# Patient Record
Sex: MALE | Race: WHITE | NOT HISPANIC OR LATINO | Employment: OTHER | URBAN - METROPOLITAN AREA
[De-identification: names, ages, dates, MRNs, and addresses within clinical notes are randomized per-mention and may not be internally consistent; named-entity substitution may affect disease eponyms.]

---

## 2020-01-01 ENCOUNTER — HOSPITAL ENCOUNTER (EMERGENCY)
Facility: HOSPITAL | Age: 75
End: 2020-06-08
Attending: EMERGENCY MEDICINE | Admitting: EMERGENCY MEDICINE

## 2020-01-01 ENCOUNTER — APPOINTMENT (OUTPATIENT)
Dept: CT IMAGING | Facility: HOSPITAL | Age: 75
End: 2020-01-01

## 2020-01-01 ENCOUNTER — APPOINTMENT (OUTPATIENT)
Dept: GENERAL RADIOLOGY | Facility: HOSPITAL | Age: 75
End: 2020-01-01

## 2020-01-01 VITALS
SYSTOLIC BLOOD PRESSURE: 127 MMHG | DIASTOLIC BLOOD PRESSURE: 70 MMHG | BODY MASS INDEX: 37.1 KG/M2 | TEMPERATURE: 96.2 F | OXYGEN SATURATION: 47 % | HEIGHT: 71 IN | RESPIRATION RATE: 5 BRPM | HEART RATE: 69 BPM | WEIGHT: 265 LBS

## 2020-01-01 DIAGNOSIS — I10 ESSENTIAL HYPERTENSION: ICD-10-CM

## 2020-01-01 DIAGNOSIS — E87.6 HYPOKALEMIA: ICD-10-CM

## 2020-01-01 DIAGNOSIS — Z79.01 CHRONIC ANTICOAGULATION: ICD-10-CM

## 2020-01-01 DIAGNOSIS — I62.9 INTRACRANIAL HEMORRHAGE (HCC): Primary | ICD-10-CM

## 2020-01-01 LAB
ABO GROUP BLD: NORMAL
ALT SERPL W P-5'-P-CCNC: 13 U/L (ref 1–41)
APTT PPP: 30.5 SECONDS (ref 24–37)
AST SERPL-CCNC: 28 U/L (ref 1–40)
BASE EXCESS BLDA CALC-SCNC: 3 MMOL/L (ref -5–5)
BASOPHILS # BLD AUTO: 0.06 10*3/MM3 (ref 0–0.2)
BASOPHILS NFR BLD AUTO: 0.5 % (ref 0–1.5)
BLD GP AB SCN SERPL QL: NEGATIVE
CA-I BLDA-SCNC: 1.09 MMOL/L (ref 1.2–1.32)
CO2 BLDA-SCNC: 32 MMOL/L (ref 24–29)
CREAT BLDA-MCNC: 0.6 MG/DL (ref 0.6–1.3)
DEPRECATED RDW RBC AUTO: 43.6 FL (ref 37–54)
EOSINOPHIL # BLD AUTO: 0.19 10*3/MM3 (ref 0–0.4)
EOSINOPHIL NFR BLD AUTO: 1.4 % (ref 0.3–6.2)
ERYTHROCYTE [DISTWIDTH] IN BLOOD BY AUTOMATED COUNT: 13 % (ref 12.3–15.4)
HCO3 BLDA-SCNC: 30.2 MMOL/L (ref 22–26)
HCT VFR BLD AUTO: 48.6 % (ref 37.5–51)
HCT VFR BLDA CALC: 49 % (ref 38–51)
HGB BLD-MCNC: 16.3 G/DL (ref 13–17.7)
HGB BLDA-MCNC: 16.7 G/DL (ref 12–17)
HOLD SPECIMEN: NORMAL
HOLD SPECIMEN: NORMAL
IMM GRANULOCYTES # BLD AUTO: 0.06 10*3/MM3 (ref 0–0.05)
IMM GRANULOCYTES NFR BLD AUTO: 0.5 % (ref 0–0.5)
INR PPP: 1.11 (ref 0.85–1.16)
LYMPHOCYTES # BLD AUTO: 2.59 10*3/MM3 (ref 0.7–3.1)
LYMPHOCYTES NFR BLD AUTO: 19.5 % (ref 19.6–45.3)
MCH RBC QN AUTO: 30.9 PG (ref 26.6–33)
MCHC RBC AUTO-ENTMCNC: 33.5 G/DL (ref 31.5–35.7)
MCV RBC AUTO: 92.2 FL (ref 79–97)
MONOCYTES # BLD AUTO: 0.51 10*3/MM3 (ref 0.1–0.9)
MONOCYTES NFR BLD AUTO: 3.8 % (ref 5–12)
NEUTROPHILS # BLD AUTO: 9.89 10*3/MM3 (ref 1.7–7)
NEUTROPHILS NFR BLD AUTO: 74.3 % (ref 42.7–76)
NRBC BLD AUTO-RTO: 0 /100 WBC (ref 0–0.2)
PCO2 BLDA: 63.9 MM HG (ref 35–45)
PH BLDA: 7.28 PH UNITS (ref 7.35–7.6)
PLATELET # BLD AUTO: 191 10*3/MM3 (ref 140–450)
PMV BLD AUTO: 10.5 FL (ref 6–12)
PO2 BLDA: 329 MMHG (ref 80–105)
POTASSIUM BLDA-SCNC: 2.5 MMOL/L (ref 3.5–4.9)
PROTHROMBIN TIME: 14 SECONDS (ref 11.5–14)
RBC # BLD AUTO: 5.27 10*6/MM3 (ref 4.14–5.8)
RH BLD: POSITIVE
SAO2 % BLDA: 100 % (ref 95–98)
SARS-COV-2 RNA PNL SPEC NAA+PROBE: NOT DETECTED
SODIUM BLDA-SCNC: 144 MMOL/L (ref 138–146)
T&S EXPIRATION DATE: NORMAL
TROPONIN T SERPL-MCNC: <0.01 NG/ML (ref 0–0.03)
WBC NRBC COR # BLD: 13.3 10*3/MM3 (ref 3.4–10.8)
WHOLE BLOOD HOLD SPECIMEN: NORMAL
WHOLE BLOOD HOLD SPECIMEN: NORMAL

## 2020-01-01 PROCEDURE — 82565 ASSAY OF CREATININE: CPT

## 2020-01-01 PROCEDURE — 96366 THER/PROPH/DIAG IV INF ADDON: CPT

## 2020-01-01 PROCEDURE — 96375 TX/PRO/DX INJ NEW DRUG ADDON: CPT

## 2020-01-01 PROCEDURE — 25010000002 PROTHROMBIN COMPLEX CONC HUMAN 1000 UNITS KIT: Performed by: EMERGENCY MEDICINE

## 2020-01-01 PROCEDURE — 82803 BLOOD GASES ANY COMBINATION: CPT

## 2020-01-01 PROCEDURE — 70450 CT HEAD/BRAIN W/O DYE: CPT

## 2020-01-01 PROCEDURE — 86900 BLOOD TYPING SEROLOGIC ABO: CPT | Performed by: EMERGENCY MEDICINE

## 2020-01-01 PROCEDURE — 94799 UNLISTED PULMONARY SVC/PX: CPT

## 2020-01-01 PROCEDURE — 94002 VENT MGMT INPAT INIT DAY: CPT

## 2020-01-01 PROCEDURE — 85610 PROTHROMBIN TIME: CPT | Performed by: EMERGENCY MEDICINE

## 2020-01-01 PROCEDURE — 99283 EMERGENCY DEPT VISIT LOW MDM: CPT | Performed by: NEUROLOGICAL SURGERY

## 2020-01-01 PROCEDURE — 84450 TRANSFERASE (AST) (SGOT): CPT | Performed by: EMERGENCY MEDICINE

## 2020-01-01 PROCEDURE — 84132 ASSAY OF SERUM POTASSIUM: CPT

## 2020-01-01 PROCEDURE — 86901 BLOOD TYPING SEROLOGIC RH(D): CPT | Performed by: EMERGENCY MEDICINE

## 2020-01-01 PROCEDURE — 85730 THROMBOPLASTIN TIME PARTIAL: CPT | Performed by: EMERGENCY MEDICINE

## 2020-01-01 PROCEDURE — 94770: CPT

## 2020-01-01 PROCEDURE — 96365 THER/PROPH/DIAG IV INF INIT: CPT

## 2020-01-01 PROCEDURE — 96368 THER/DIAG CONCURRENT INF: CPT

## 2020-01-01 PROCEDURE — 25010000002 LORAZEPAM PER 2 MG: Performed by: EMERGENCY MEDICINE

## 2020-01-01 PROCEDURE — 99291 CRITICAL CARE FIRST HOUR: CPT

## 2020-01-01 PROCEDURE — 94003 VENT MGMT INPAT SUBQ DAY: CPT

## 2020-01-01 PROCEDURE — 82330 ASSAY OF CALCIUM: CPT

## 2020-01-01 PROCEDURE — 84295 ASSAY OF SERUM SODIUM: CPT

## 2020-01-01 PROCEDURE — 71045 X-RAY EXAM CHEST 1 VIEW: CPT

## 2020-01-01 PROCEDURE — 85025 COMPLETE CBC W/AUTO DIFF WBC: CPT | Performed by: EMERGENCY MEDICINE

## 2020-01-01 PROCEDURE — 86850 RBC ANTIBODY SCREEN: CPT | Performed by: EMERGENCY MEDICINE

## 2020-01-01 PROCEDURE — 84460 ALANINE AMINO (ALT) (SGPT): CPT | Performed by: EMERGENCY MEDICINE

## 2020-01-01 PROCEDURE — 82947 ASSAY GLUCOSE BLOOD QUANT: CPT

## 2020-01-01 PROCEDURE — 93005 ELECTROCARDIOGRAM TRACING: CPT | Performed by: EMERGENCY MEDICINE

## 2020-01-01 PROCEDURE — 96376 TX/PRO/DX INJ SAME DRUG ADON: CPT

## 2020-01-01 PROCEDURE — C9132 KCENTRA, PER I.U.: HCPCS | Performed by: EMERGENCY MEDICINE

## 2020-01-01 PROCEDURE — 85014 HEMATOCRIT: CPT

## 2020-01-01 PROCEDURE — 25010000002 MIDAZOLAM PER 1 MG: Performed by: EMERGENCY MEDICINE

## 2020-01-01 PROCEDURE — 25010000002 MIDAZOLAM 50 MG/10ML SOLUTION: Performed by: EMERGENCY MEDICINE

## 2020-01-01 PROCEDURE — 84484 ASSAY OF TROPONIN QUANT: CPT | Performed by: EMERGENCY MEDICINE

## 2020-01-01 PROCEDURE — 87635 SARS-COV-2 COVID-19 AMP PRB: CPT | Performed by: EMERGENCY MEDICINE

## 2020-01-01 RX ORDER — MIDAZOLAM HYDROCHLORIDE 1 MG/ML
2 INJECTION INTRAMUSCULAR; INTRAVENOUS ONCE
Status: COMPLETED | OUTPATIENT
Start: 2020-01-01 | End: 2020-01-01

## 2020-01-01 RX ORDER — MIDAZOLAM HCL IN 0.9 % NACL/PF 1 MG/ML
1-10 PLASTIC BAG, INJECTION (ML) INTRAVENOUS
Status: DISCONTINUED | OUTPATIENT
Start: 2020-01-01 | End: 2020-01-01 | Stop reason: HOSPADM

## 2020-01-01 RX ORDER — LORAZEPAM 2 MG/ML
1 INJECTION INTRAMUSCULAR ONCE
Status: COMPLETED | OUTPATIENT
Start: 2020-01-01 | End: 2020-01-01

## 2020-01-01 RX ORDER — SODIUM CHLORIDE 0.9 % (FLUSH) 0.9 %
10 SYRINGE (ML) INJECTION AS NEEDED
Status: DISCONTINUED | OUTPATIENT
Start: 2020-01-01 | End: 2020-01-01 | Stop reason: HOSPADM

## 2020-01-01 RX ADMIN — NICARDIPINE HYDROCHLORIDE 15 MG/HR: 0.1 INJECTION, SOLUTION INTRAVENOUS at 13:24

## 2020-01-01 RX ADMIN — MIDAZOLAM 2 MG: 1 INJECTION INTRAMUSCULAR; INTRAVENOUS at 13:22

## 2020-01-01 RX ADMIN — PROTHROMBIN, COAGULATION FACTOR VII HUMAN, COAGULATION FACTOR IX HUMAN, COAGULATION FACTOR X HUMAN, PROTEIN C, PROTEIN S HUMAN, AND WATER 5000 UNITS: KIT at 11:53

## 2020-01-01 RX ADMIN — NICARDIPINE HYDROCHLORIDE 5 MG/HR: 0.1 INJECTION, SOLUTION INTRAVENOUS at 11:34

## 2020-01-01 RX ADMIN — LORAZEPAM 1 MG: 2 INJECTION INTRAMUSCULAR; INTRAVENOUS at 13:08

## 2020-01-01 RX ADMIN — MIDAZOLAM HYDROCHLORIDE 2 MG/HR: 5 INJECTION, SOLUTION INTRAMUSCULAR; INTRAVENOUS at 13:33

## 2020-06-08 NOTE — ED NOTES
Patients family, Wife Daughter and two Sons are waiting outside. Requesting an update, I took phone numbers down.      Anshu Howell  06/08/20 8341

## 2020-06-08 NOTE — ED PROVIDER NOTES
Subjective   74-year-old male brought in by [facility] flight crew for altered mental status.  The story from the patient's wife is that he was suspected to have gone out to water flowers around 7 AM this morning.  She called for him to eat breakfast around 9 AM but did not get a response.  When somebody went to check on him at approximately 9 AM he was noted to be passed out in an outside garage bathroom, unresponsive.  Per the son the patient has been complaining of dizziness that has been more severe in nature for the last 2 days.  Per the son the patient has had some degree of dizziness since his most recent heart surgery. The wife reports a known history of A. fib and the patient takes Eliquis.  He also has a known history of high blood pressure.  Previous has history of coronary artery disease.  Per the wife there is been no recent fever, body aches, or chills.  No respiratory symptoms including no cough or shortness of breath.  No other new complaints per son and wife.  No information can be obtained from the patient due to his mental status.  The patient was brought by [facility] flight crew from [facility].  They report that they arrived on scene and the patient had pinpoint pupils, was unresponsive with agonal breaths.  Intubated on scene.  In route the flight crew reports that the patient's right pupil has remained approxi-2 mm and the left pupil is now approximately 4 mm.  He was administered a combination of fentanyl, ketamine, and rocuronium for RSI.  He has remained hypertensive in route with a systolic of 190-240.  No other reported aggravating, alleviating, or associated symptoms.          Review of Systems   Constitutional: Negative for chills, fatigue and fever.   HENT: Negative for congestion, ear pain, postnasal drip, sinus pressure and sore throat.    Eyes: Negative for pain, redness and visual disturbance.   Respiratory: Negative for cough, chest tightness and shortness of breath.    Cardiovascular:  Negative for chest pain, palpitations and leg swelling.   Gastrointestinal: Negative for abdominal pain, anal bleeding, blood in stool, diarrhea, nausea and vomiting.   Endocrine: Negative for polydipsia and polyuria.   Genitourinary: Negative for difficulty urinating, dysuria, frequency and urgency.   Musculoskeletal: Negative for arthralgias, back pain and neck pain.   Skin: Negative for pallor and rash.   Allergic/Immunologic: Negative for environmental allergies and immunocompromised state.   Neurological: Positive for dizziness. Negative for weakness and headaches.        Unresponsive   Hematological: Negative for adenopathy.   Psychiatric/Behavioral: Negative for confusion, self-injury and suicidal ideas. The patient is not nervous/anxious.    All other systems reviewed and are negative.      No past medical history on file.    Allergies   Allergen Reactions   • Aspirin Other (See Comments)     unknown       No past surgical history on file.    No family history on file.    Social History     Socioeconomic History   • Marital status:      Spouse name: Not on file   • Number of children: Not on file   • Years of education: Not on file   • Highest education level: Not on file           Objective   Physical Exam   Constitutional: He appears well-developed and well-nourished.  Non-toxic appearance. He appears distressed. He is intubated.   HENT:   Head: Normocephalic and atraumatic.   Right Ear: External ear normal.   Left Ear: External ear normal.   Nose: Nose normal.   Eyes: Conjunctivae and lids are normal. Right pupil is not reactive. Left pupil is not reactive. Pupils are unequal.       Neck: No tracheal deviation present.   Cardiovascular: Normal rate and normal heart sounds. An irregularly irregular rhythm present. Exam reveals no gallop, no friction rub and no decreased pulses.   No murmur heard.  EKG shows underlying junctional rhythm with frequent PVCs.  Normal rate, irregular rhythm.      Pulmonary/Chest: He is intubated. No respiratory distress. He has decreased breath sounds in the right lower field and the left lower field. He has no wheezes. He has no rhonchi. He has rales in the right lower field and the left lower field.           Abdominal: Soft. Normal appearance and bowel sounds are normal. There is no tenderness. There is no rebound and no guarding.   Musculoskeletal: Normal range of motion. He exhibits no deformity.   Lymphadenopathy:     He has no cervical adenopathy.   Neurological: He has normal strength. He is unresponsive. No cranial nerve deficit or sensory deficit. GCS eye subscore is 1. GCS verbal subscore is 1. GCS motor subscore is 1.   GCS 3 T.  Paralytics have been administered by flight crew prior to arrival.    Left pupil 4 mm nonreactive.  Right pupil 2 mm nonreactive.   Skin: Skin is warm and dry. No rash noted. He is not diaphoretic.        Psychiatric: He has a normal mood and affect. His speech is normal and behavior is normal. Judgment and thought content normal. Cognition and memory are normal.   Nursing note and vitals reviewed.      Critical Care  Performed by: Petros Bentley MD  Authorized by: Petors Bentley MD     Critical care provider statement:     Critical care time (minutes):  60    Critical care time was exclusive of:  Separately billable procedures and treating other patients    Critical care was necessary to treat or prevent imminent or life-threatening deterioration of the following conditions:  CNS failure or compromise    Critical care was time spent personally by me on the following activities:  Development of treatment plan with patient or surrogate, discussions with consultants, evaluation of patient's response to treatment, examination of patient, obtaining history from patient or surrogate, ordering and performing treatments and interventions, ordering and review of laboratory studies, ordering and review of radiographic studies,  pulse oximetry, re-evaluation of patient's condition and review of old charts               ED Course  ED Course as of 1713   Mon 2020   1111 I discussed the CT scan findings with Dr. Stevens's nurse in the operating room.  Dr. Stevens will review the CT.    [NS]   1119 Cardene drip will be initiated for hypertension.  Unaware if the patient takes any anticoagulants, information is not available.    [NS]      ED Course User Index  [NS] Petros Bentley MD                                           MDM  Number of Diagnoses or Management Options  Chronic anticoagulation: new and requires workup  Essential hypertension: new and requires workup  Hypokalemia: new and requires workup  Intracranial hemorrhage (CMS/HCC): new and requires workup  Diagnosis management comments: ICH score of 4.    The patient had a large intracranial hemorrhage that was felt to arise from the left temporal region with intraventricular extension, left to right shift, and subfalcine herniation.    Blood pressure was noted to be significantly elevated for EMS, Cardene drip was initiated with a target pressure of 140.    The patient was discovered to take Eliquis after conversation with the wife for treatment of A. fib.  Therefore Kcentra was initiated.    The patient remains intubated and unresponsive, GCS 3 T.    While here in the emergency department the patient was evaluated by neurosurgery, Dr. Stevens.  He recommends end-of-life comfort care.  This was discussed with the family and they ultimately agreed.    The patient will be extubated in the ER.     Extubated by respiratory therapy.     Patient became asystolic on monitor at 1705.  Pronounced  at 1707 on 2020      Family members are present at bedside and understand that the patient has been pronounced .  No further questions at this time.         Amount and/or Complexity of Data Reviewed  Clinical lab tests: ordered and reviewed  Tests in the radiology  section of CPT®: ordered and reviewed  Obtain history from someone other than the patient: yes  Review and summarize past medical records: yes  Discuss the patient with other providers: yes  Independent visualization of images, tracings, or specimens: yes    Risk of Complications, Morbidity, and/or Mortality  Presenting problems: high  Diagnostic procedures: high  Management options: high    Critical Care  Total time providing critical care: 30-74 minutes      Final diagnoses:   Intracranial hemorrhage (CMS/HCC)   Essential hypertension   Chronic anticoagulation   Hypokalemia            Petros Bentley MD  06/08/20 8628

## 2020-06-08 NOTE — DISCHARGE PLACEMENT REQUEST
"Vipin Lebron (74 y.o. Male)     Date of Birth Social Security Number Address Home Phone MRN    1945  [id number] [place] [address] [phone] [id number]    Rastafari Marital Status          None        Admission Date Admission Type Admitting Provider Attending Provider Department, Room/Bed    6/8/20 Emergency  Petros Bentley MD [facility] Emergency Department, 06/06    Discharge Date Discharge Disposition Discharge Destination                       Attending Provider:  Petros Bentley MD    Allergies:  Aspirin    Isolation:  None   Infection:  None   Code Status:  Not on file    Ht:  180.3 cm (71\")   Wt:  120 kg (265 lb)    Admission Cmt:  None   Principal Problem:  None                Active Insurance as of 6/8/2020     Primary Coverage     Payor Plan Insurance Group Employer/Plan Group    MEDICARE MEDICARE A & B      Payor Plan Address Payor Plan Phone Number Payor Plan Fax Number Effective Dates    [address] [phone]  5/1/1997 - None Entered    [place] [address]       Subscriber Name Subscriber Birth Date Member ID       VIPIN LEBRON 1945 [id number]                 Emergency Contacts      (Rel.) Home Phone Work Phone Mobile Phone    GALINA LEBRON (Spouse) -- -- [phone]    VIPIN LEBRON (Son) -- -- [phone]            Emergency Contact Information     Name Relation Home Work Mobile    GALINA LEBRON Spouse   [phone]    VIPIN LEBRON Son   [phone]          Insurance Information                MEDICARE/MEDICARE A & B Phone: [phone]    Subscriber: Vipin Lebron Subscriber#: [id number]    Group#:  Precert#:           History & Physical    No notes of this type exist for this encounter.         "

## 2020-06-08 NOTE — CONSULTS
"NEUROSURGERY CONSULTATION    Referring Provider: Dr. Bentley  Patient Care Team:  System, Provider Not In as PCP - General    Chief Complaint: Obtundation.    History of Present Illness: Mr. Lebron is a 74-year-old retired gentleman with a history of heart disease including atrial fibrillation.  He takes Eliquis.  He does have a history of hypertension.  This morning he was found unresponsive.  Apparently he had been complaining of some \"dizziness\" for couple of days.  EMS was summoned.  The patient was noted to be unresponsive and had agonal respirations.  He was intubated at the scene and brought to this facility for evaluation.      Review of Systems:  Musculoskeletal and Neurological systems could not be performed given diminished level of consciousness.  History:  No past medical history on file., No past surgical history on file., No family history on file., Social History     Tobacco Use   • Smoking status: Not on file   Substance Use Topics   • Alcohol use: Not on file   • Drug use: Not on file   ,   (Not in a hospital admission) Allergies:  Aspirin      Physical Exam:  Vital Signs: Blood pressure 140/76, pulse 60, temperature 96.2 °F (35.7 °C), temperature source Rectal, resp. rate 12, height 180.3 cm (71\"), weight 120 kg (265 lb), SpO2 99 %.  Patient is intubated.  He is unresponsive.  There is no eye-opening or response to threatening visual stimuli.  Pupils are fixed and dilated with the right pupil larger than the left.  Doll's eye response is negative.  Corneal responses are absent.  There is no gag with the endotracheal tube.  He is not breathing over the ventilator.  There is no movement in his extremities to noxious stimuli.      Data Review:  CT of the brain demonstrates a large acute left ICH with intraventricular extension.  There is substantial left to right shift.  There is casting of the left lateral ventricle, third ventricle, and the fourth ventricle.  There is some " ventriculomegaly.    Diagnosis:  Intracerebral hemorrhage with intraventricular extension.  This is probably due to hypertension in the setting of anticoagulation.    Treatment Recommendations:  The patient is either brain dead or nearly brain dead.  There is no role for surgical intervention and I have discussed my findings with the patient's family.  I have recommended comfort measures with serial neurologic examinations or withdrawal of care.      Mir Stevens MD  06/08/20  13:01